# Patient Record
Sex: MALE | Race: WHITE | NOT HISPANIC OR LATINO | Employment: FULL TIME | URBAN - METROPOLITAN AREA
[De-identification: names, ages, dates, MRNs, and addresses within clinical notes are randomized per-mention and may not be internally consistent; named-entity substitution may affect disease eponyms.]

---

## 2019-02-25 ENCOUNTER — APPOINTMENT (OUTPATIENT)
Dept: RADIOLOGY | Facility: MEDICAL CENTER | Age: 49
End: 2019-02-25
Attending: EMERGENCY MEDICINE
Payer: OTHER MISCELLANEOUS

## 2019-02-25 ENCOUNTER — HOSPITAL ENCOUNTER (EMERGENCY)
Facility: MEDICAL CENTER | Age: 49
End: 2019-02-25
Attending: EMERGENCY MEDICINE
Payer: OTHER MISCELLANEOUS

## 2019-02-25 VITALS
RESPIRATION RATE: 16 BRPM | SYSTOLIC BLOOD PRESSURE: 145 MMHG | DIASTOLIC BLOOD PRESSURE: 98 MMHG | HEIGHT: 69 IN | OXYGEN SATURATION: 98 % | WEIGHT: 200 LBS | BODY MASS INDEX: 29.62 KG/M2 | TEMPERATURE: 97.9 F | HEART RATE: 88 BPM

## 2019-02-25 DIAGNOSIS — S42.001A CLOSED DISPLACED FRACTURE OF RIGHT CLAVICLE, UNSPECIFIED PART OF CLAVICLE, INITIAL ENCOUNTER: ICD-10-CM

## 2019-02-25 PROCEDURE — 305948 HCHG GREEN TRAUMA ACT PRE-NOTIFY NO CC

## 2019-02-25 PROCEDURE — 73000 X-RAY EXAM OF COLLAR BONE: CPT | Mod: RT

## 2019-02-25 PROCEDURE — 71045 X-RAY EXAM CHEST 1 VIEW: CPT

## 2019-02-25 PROCEDURE — 99284 EMERGENCY DEPT VISIT MOD MDM: CPT

## 2019-02-25 PROCEDURE — 73030 X-RAY EXAM OF SHOULDER: CPT | Mod: RT

## 2019-02-25 RX ORDER — HYDROCODONE BITARTRATE AND ACETAMINOPHEN 5; 325 MG/1; MG/1
1-2 TABLET ORAL EVERY 6 HOURS PRN
Qty: 20 TAB | Refills: 0 | Status: SHIPPED | OUTPATIENT
Start: 2019-02-25 | End: 2019-03-02

## 2019-02-25 NOTE — ED NOTES
Med Rec completed per patient  Allergies reviewed  No ORAL antibiotics in last 30 days    Patient bianka taking any medications
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
## 2019-02-25 NOTE — ED NOTES
Pt verbalizes understanding of discharge and follow-up instructions.  Given Rx X1.  VSS.  All questions answered.  Ambulates to discharge with steady gait.
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
## 2020-11-06 ENCOUNTER — HOSPITAL ENCOUNTER (EMERGENCY)
Facility: MEDICAL CENTER | Age: 50
End: 2020-11-06

## 2020-11-06 VITALS
BODY MASS INDEX: 30.2 KG/M2 | HEART RATE: 95 BPM | OXYGEN SATURATION: 99 % | WEIGHT: 203.93 LBS | SYSTOLIC BLOOD PRESSURE: 201 MMHG | RESPIRATION RATE: 16 BRPM | DIASTOLIC BLOOD PRESSURE: 123 MMHG | TEMPERATURE: 97.5 F | HEIGHT: 69 IN

## 2020-11-06 PROCEDURE — 302449 STATCHG TRIAGE ONLY (STATISTIC)
